# Patient Record
Sex: FEMALE | Race: WHITE | NOT HISPANIC OR LATINO | URBAN - METROPOLITAN AREA
[De-identification: names, ages, dates, MRNs, and addresses within clinical notes are randomized per-mention and may not be internally consistent; named-entity substitution may affect disease eponyms.]

---

## 2017-02-19 ENCOUNTER — COMMUNICATION - HEALTHEAST (OUTPATIENT)
Dept: INTERNAL MEDICINE | Facility: CLINIC | Age: 82
End: 2017-02-19

## 2017-02-19 DIAGNOSIS — K21.00 REFLUX ESOPHAGITIS: ICD-10-CM

## 2017-05-19 ENCOUNTER — COMMUNICATION - HEALTHEAST (OUTPATIENT)
Dept: INTERNAL MEDICINE | Facility: CLINIC | Age: 82
End: 2017-05-19

## 2017-07-18 ENCOUNTER — COMMUNICATION - HEALTHEAST (OUTPATIENT)
Dept: INTERNAL MEDICINE | Facility: CLINIC | Age: 82
End: 2017-07-18

## 2017-07-18 DIAGNOSIS — I10 HYPERTENSION: ICD-10-CM

## 2018-01-01 ENCOUNTER — OFFICE VISIT - HEALTHEAST (OUTPATIENT)
Dept: INTERNAL MEDICINE | Facility: CLINIC | Age: 83
End: 2018-01-01

## 2018-01-01 ENCOUNTER — COMMUNICATION - HEALTHEAST (OUTPATIENT)
Dept: INTERNAL MEDICINE | Facility: CLINIC | Age: 83
End: 2018-01-01

## 2018-01-01 DIAGNOSIS — I10 HYPERTENSION: ICD-10-CM

## 2018-01-01 DIAGNOSIS — I10 ESSENTIAL HYPERTENSION: ICD-10-CM

## 2018-01-01 DIAGNOSIS — M85.89 OSTEOPENIA OF MULTIPLE SITES: ICD-10-CM

## 2018-01-01 DIAGNOSIS — K21.00 REFLUX ESOPHAGITIS: ICD-10-CM

## 2018-01-01 DIAGNOSIS — Z00.00 ENCOUNTER FOR MEDICARE ANNUAL WELLNESS EXAM: ICD-10-CM

## 2018-01-01 LAB
25(OH)D3 SERPL-MCNC: 62.6 NG/ML (ref 30–80)
ANION GAP SERPL CALCULATED.3IONS-SCNC: 15 MMOL/L (ref 5–18)
BUN SERPL-MCNC: 20 MG/DL (ref 8–28)
CALCIUM SERPL-MCNC: 9.8 MG/DL (ref 8.5–10.5)
CHLORIDE BLD-SCNC: 99 MMOL/L (ref 98–107)
CHOLEST SERPL-MCNC: 251 MG/DL
CO2 SERPL-SCNC: 27 MMOL/L (ref 22–31)
CREAT SERPL-MCNC: 1.08 MG/DL (ref 0.6–1.1)
FASTING STATUS PATIENT QL REPORTED: NO
GFR SERPL CREATININE-BSD FRML MDRD: 48 ML/MIN/1.73M2
GLUCOSE BLD-MCNC: 97 MG/DL (ref 70–125)
HDLC SERPL-MCNC: 72 MG/DL
LDLC SERPL CALC-MCNC: 163 MG/DL
POTASSIUM BLD-SCNC: 3.8 MMOL/L (ref 3.5–5)
SODIUM SERPL-SCNC: 141 MMOL/L (ref 136–145)
TRIGL SERPL-MCNC: 82 MG/DL

## 2018-01-01 RX ORDER — AMLODIPINE BESYLATE 5 MG/1
5 TABLET ORAL DAILY
Qty: 90 TABLET | Refills: 3 | Status: SHIPPED | OUTPATIENT
Start: 2018-01-01 | End: 2019-12-13

## 2018-01-01 ASSESSMENT — MIFFLIN-ST. JEOR: SCORE: 764.69

## 2019-01-07 ENCOUNTER — COMMUNICATION - HEALTHEAST (OUTPATIENT)
Dept: INTERNAL MEDICINE | Facility: CLINIC | Age: 84
End: 2019-01-07

## 2019-02-19 ENCOUNTER — COMMUNICATION - HEALTHEAST (OUTPATIENT)
Dept: INTERNAL MEDICINE | Facility: CLINIC | Age: 84
End: 2019-02-19

## 2021-06-01 ENCOUNTER — RECORDS - HEALTHEAST (OUTPATIENT)
Dept: ADMINISTRATIVE | Facility: CLINIC | Age: 86
End: 2021-06-01

## 2021-06-02 VITALS — WEIGHT: 99 LBS | HEIGHT: 59 IN | BODY MASS INDEX: 19.96 KG/M2

## 2021-06-16 PROBLEM — M85.89 OSTEOPENIA OF MULTIPLE SITES: Status: ACTIVE | Noted: 2018-01-01

## 2021-06-22 NOTE — PROGRESS NOTES
Assessment and Plan:       1. Encounter for Medicare annual wellness exam  - Pneumococcal conjugate vaccine 13-valent 6wks-17yrs; >50yrs    2. Essential hypertension  Offered increase in amlodipine.  She defers.  Difficult to improve on the health of an active, almost 89-year-old  - amLODIPine (NORVASC) 5 MG tablet; Take 1 tablet (5 mg total) by mouth daily.  Dispense: 90 tablet; Refill: 3  - Basic Metabolic Panel  - Lipid Cascade    3. Chronic Reflux Esophagitis  Stable on high-dose treatment    4. Osteopenia of multiple sites  - Vitamin D, Total (25-Hydroxy)        The patient's current medical problems were reviewed.    I have had an Advance Directives discussion with the patient.  The following health maintenance schedule was reviewed with the patient and provided in printed form in the after visit summary:   Health Maintenance   Topic Date Due     ZOSTER VACCINES (1 of 2) 01/21/1979     PNEUMOCOCCAL CONJUGATE VACCINE FOR ADULTS (PCV13 OR PREVNAR)  01/21/1994     TD 18+ HE  06/01/2017     INFLUENZA VACCINE RULE BASED (1) 08/01/2018     DXA SCAN  12/09/2018     FALL RISK ASSESSMENT  12/13/2019     ADVANCE DIRECTIVES DISCUSSED WITH PATIENT  12/13/2023     PNEUMOCOCCAL POLYSACCHARIDE VACCINE AGE 65 AND OVER  Completed        Subjective:   Chief Complaint: Nicole Stephenson is an 89 y.o. female here for an Annual Wellness visit.   HPI:      Fall/Lumbar compression fracture: She had a fall off a table 2 years ago that was a pain. That was the last time she was seen in clinic, and since then has been healing. It still hurts some, but it is much improved. MRI at that time showed compression fracture of L1. She got a cement injection for this that must have done the trick because it started improving after that. She is not using any pain medications.     GERD: Tolerating current medications well. She does occasionally still get a little heart burn from time to time. No specific time of day that she gets acid flare up  more often than other times.     Weight management: Her weight is stable.    HTN: sometimes her bp gets high. Right now she is nervous but she does not think it usually is high.  200/84 and 220/90 in office today.     Health management: She does not get flu shots. Pneumonia booster to be given in office today. Shingrix due.     Living will: Children Art and Ivette both are her preferred choice for medical decision making. She is unsure about most other aspects of a living will and will discuss further with her children.     Review of Systems:   Denies infections, rashes, weight loss/gain, sore throat,  Recent bladder infections, dyspnea, constipation/diarrhea, arthritis, dysuria, sinus problems, fatigue,   Please see above.  The rest of the review of systems are negative for all systems.    PMFS:  She had a cement injection into lumbar spine.   She has two children, they live nearby and check in on her periodically.   She still lives alone and does her own cooking and cleaning. She even goes out to shovel still.   She takes Calcium.   Reviewed as below.    Patient Care Team:  Andi Ramírez MD as PCP - General     Patient Active Problem List   Diagnosis     Raynaud's Disease     Carotid Artery Stenosis     Hyperlipidemia     Tinea Corporis     Adhesive Capsulitis Of Right Shoulder     Essential hypertension     Subarachnoid Hemorrhage     Chronic Sinusitis     Chronic Reflux Esophagitis     Chronic Kidney Disease (NKF Classification)     Osteopenia of multiple sites     Past Medical History:   Diagnosis Date     Acute sinusitis      Cataract      Hypertension      Pyuria       Past Surgical History:   Procedure Laterality Date     ABDOMINAL HYSTERECTOMY       CATARACT EXTRACTION Left 2000     FL APPENDECTOMY      Description: Appendectomy;  Recorded: 12/20/2009;     FL THROMBOENDARTECTMY NECK,NECK INCIS      Description: Carotid Thromboendarterectomy;  Recorded: 06/24/2014;  Comments: left carotid  "endarterectomy with patch angioplasty for left carotid stenosis by Dr. Brenner and Cristiane Sahu PA-C at [facility]      Family History   Problem Relation Age of Onset     Bradycardia Mother      Heart attack Father      Diabetes Son       Social History     Socioeconomic History     Marital status:      Spouse name: Not on file     Number of children: Not on file     Years of education: Not on file     Highest education level: Not on file   Social Needs     Financial resource strain: Not on file     Food insecurity - worry: Not on file     Food insecurity - inability: Not on file     Transportation needs - medical: Not on file     Transportation needs - non-medical: Not on file   Occupational History     Not on file   Tobacco Use     Smoking status: Never Smoker   Substance and Sexual Activity     Alcohol use: No     Drug use: No     Sexual activity: Not on file   Other Topics Concern     Not on file   Social History Narrative     Not on file      Current Outpatient Medications   Medication Sig Dispense Refill     amLODIPine (NORVASC) 5 MG tablet Take 1 tablet (5 mg total) by mouth daily. 90 tablet 3     loperamide (IMODIUM A-D) 2 mg tablet Take 2 mg by mouth as needed for diarrhea.       omeprazole (PRILOSEC) 20 MG capsule Take 1 capsule (20 mg total) by mouth 2 (two) times a day. 180 capsule 3     No current facility-administered medications for this visit.       Objective:   Vital Signs:   Visit Vitals  BP (!) 200/84 (Patient Site: Left Arm, Patient Position: Sitting, Cuff Size: Adult Regular)   Pulse 70   Resp 14   Ht 4' 11\" (1.499 m)   Wt (!) 99 lb (44.9 kg)   SpO2 99%   BMI 20.00 kg/m         VisionScreening:  No exam data present     PHYSICAL EXAM  Constitutional:  Reveals a thin, alert oriented elderly woman in no acute distress, affect appropriate, clears throat often, ambulates unaided.  Vitals:  Per nursing notes.  Ears: External ear canals and TMs clear without erythema or excess " cerumen  Oropharynx:  Without exudate, erythema, posterior nasal drainage or thrush.  Neck:  Supple, thyroid not palpable.  Cardiac:  Regular rate and rhythm without murmurs, rubs, or gallops. Legs without edema. Palpation of the radial pulse regular.  Lungs: Clear lung fields bilaterally, no wheezes crackles or rhonchi.  Respiratory effort normal.  Abdomen:   Bowel sounds positive, nontender, nondistended.  Neither liver nor spleen palpable.  Neurologic: Cranial nerves II-XII, motor strength, sensation, and coordination grossly intact.     Psychiatric:  Mood appropriate, memory intact.       Assessment Results 12/13/2018   Activities of Daily Living No help needed   Instrumental Activities of Daily Living No help needed   Mini Cog Total Score 2   Some recent data might be hidden     A Mini-Cog score of 0-2 suggests the possibility of dementia, score of 3-5 suggests no dementia    Identified Health Risks:     She is at risk for lack of exercise and has been provided with information to increase physical activity for the benefit of her well-being.  Patient's advanced directive was discussed and I am comfortable with the patient's wishes.        The patient was provided with appropriate referrals to address her memory problem.      ADDITIONAL HISTORY SUMMARIZED (2): 12/29/16 Procedure note reviewed for cement injection into spine, showing uncomplicated procedure.  12/30/16 telephone encounter showing good results from injection. 5/19/17 telephone encounter note reviewed showing sinus infection.   DECISION TO OBTAIN EXTRA INFORMATION (1): None.   RADIOLOGY TESTS (1): 12/8/16 DXA reviewed showing osteopenia and moderate fracture risk.   LABS (1): 12/29/16 labs Reviewed and ordered labs today.   MEDICINE TESTS (1): None.  INDEPENDENT REVIEW (2 each): None.     The visit lasted a total of 23 minutes face to face with the patient. Over 50% of the time was spent counseling and educating the patient about health maintenance  and preventive care.    I, Jeovanny Lucas, am scribing for and in the presence of, Dr. Ramírez.    I, Dr. Ramírez, personally performed the services described in this documentation, as scribed by Jeovanny Lucas in my presence, and it is both accurate and complete.    Total data points: 4

## 2021-06-22 NOTE — TELEPHONE ENCOUNTER
FYI - Status Update  Who is Calling: [facility] Medical Examiner's office - [place]  Update: Patient was noted as  on 2019 at 11:37AM.  Patient passed away at home in bed.  No concerned with the scene or foul play.  Patient was last spoke to on 2019 at 11 PM, went to bed daughter was unable to reach her the next day and went to the home to check on her. House was locked, she used her key to get in and found her in bed .   Appears to be a natural death according to the medical examiner.    Needing death certificate signed.  Unsure of when the  is.     home is [facility]  home in [place].    Okay to leave a detailed message?:  No return call needed

## 2021-06-24 NOTE — TELEPHONE ENCOUNTER
Who is calling:  Daughter Chris Steele  Reason for Call:  Calling crying with writer on the phone wanting to speak with Andi Ramírez MD regarding mothers death.    She knows there is HIPPA laws and does not intend to jany anyone but wants to speak with Andi Ramírez MD to gain some closure with her mothers passing.    She knows her mother had high BP and they ran across her last OV summary that stated her BP was 200/84. A few days before her passing her daughter noticed her hands were getting slightly red and she had been increasingly tired.    She is wondering if Andi Ramírez MD believes she had a stroke and if there is anything she could've/shouldve done to prevent her death.     Ivette reports being depressed and just wanting some closure on her mothers passing as her mother was her best friend and she is just worried she missed something.    Please advise if Andi Ramírez MD can contact daughter to discuss above.  Date of last appointment with primary care: 12/13/2018  Has the patient been recently seen:  No  Okay to leave a detailed message: No

## 2021-06-24 NOTE — TELEPHONE ENCOUNTER
Spoke with daughter Ivette.     I was unable to give her all of the information she was requesting, as there was no consent to communicate on file.   Message from Dr. Ramírez below was given . She was transferred to [facility] for further questions on how to obtain patients health information.

## 2021-06-24 NOTE — TELEPHONE ENCOUNTER
Her blood pressure in the office was ALWAYS high.  She always reassured me that it was ok, but I was never sure    This has gone on for 20 years    She made it to age 89, so that is pretty good, even with high blood pressure    Yes, I suspect stroke or heart attack but nothing we could have done